# Patient Record
Sex: MALE | Race: WHITE
[De-identification: names, ages, dates, MRNs, and addresses within clinical notes are randomized per-mention and may not be internally consistent; named-entity substitution may affect disease eponyms.]

---

## 2018-01-21 ENCOUNTER — HOSPITAL ENCOUNTER (EMERGENCY)
Dept: HOSPITAL 74 - JERFT | Age: 38
Discharge: HOME | End: 2018-01-21
Payer: COMMERCIAL

## 2018-01-21 VITALS — DIASTOLIC BLOOD PRESSURE: 84 MMHG | TEMPERATURE: 98.6 F | SYSTOLIC BLOOD PRESSURE: 150 MMHG | HEART RATE: 83 BPM

## 2018-01-21 VITALS — BODY MASS INDEX: 33.9 KG/M2
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
## 2018-01-21 DIAGNOSIS — M54.5: Primary | ICD-10-CM
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
## 2018-01-21 DIAGNOSIS — F17.210: ICD-10-CM
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
## 2018-01-21 PROCEDURE — 3E0233Z INTRODUCTION OF ANTI-INFLAMMATORY INTO MUSCLE, PERCUTANEOUS APPROACH: ICD-10-PCS

## 2018-01-21 NOTE — PDOC
History of Present Illness





- General


Chief Complaint: Back Pain


Stated Complaint: BACK PAIN


Time Seen by Provider: 01/21/18 17:13


History Source: Patient


Exam Limitations: No Limitations





- History of Present Illness


Initial Comments: 





01/21/18 17:21


37 yr male history of low back pain states last night was putting on his socks 

and then coughed pulling his low back. Pt felt pain to the low back. no abd 

pain or urine or bowel dysfunction. no numbness or tingling. took aleve at 8am. 





Past History





- Past Medical History


Allergies/Adverse Reactions: 


 Allergies











Allergy/AdvReac Type Severity Reaction Status Date / Time


 


[facility]   Verified 01/21/18 15:35


 


Penicillins Allergy   Verified 01/21/18 15:35











Home Medications: 


Ambulatory Orders





Cyclobenzaprine HCl [Flexeril 10 mg] 10 mg PO TID PRN #21 tablet 01/21/18 


Naproxen [Naprosyn -] 500 mg PO BID PRN #28 tablet 01/21/18 








COPD: No





- Suicide/Smoking/Psychosocial Hx


Smoking History: Current every day smoker


Information on smoking cessation initiated: No


Hx Alcohol Use: No


Drug/Substance Use Hx: No


Substance Use Type: None





Trauma Specific PMHX





- Complaint Specific PMHX


Arthritis: No


Back Injury: Yes


Neck Injury: No





**Review of Systems





- Review of Systems


Able to Perform ROS?: Yes


Is the patient limited English proficient: No


Constitutional: No: Symptoms Reported


HEENTM: No: Symptoms Reported


Respiratory: No: Symptoms reported


Cardiac (ROS): No: Symptoms Reported


ABD/GI: No: Symptoms Reported


: No: Symptoms Reported


Musculoskeletal: Yes: Back Pain


Integumentary: No: Symptoms Reported


Neurological: No: Symptoms reported





*Physical Exam





- Vital Signs


 Last Vital Signs











Temp Pulse Resp BP Pulse Ox


 


 98.6 F   83   18   150/84   100 


 


 01/21/18 15:36  01/21/18 15:36  01/21/18 15:36  01/21/18 15:36  01/21/18 15:36














- Physical Exam


General Appearance: Yes: Nourished, Appropriately Dressed


HEENT: positive: EOMI, FELIX, Normal ENT Inspection, TMs Normal, Pharynx Normal


Neck: positive: Supple.  negative: Tender


Respiratory/Chest: positive: Lungs Clear, Normal Breath Sounds.  negative: 

Chest Tender


Cardiovascular: positive: Regular Rhythm, Regular Rate


Gastrointestinal/Abdominal: positive: Normal Bowel Sounds, Soft


Musculoskeletal: positive: Normal Inspection, Muscle Spasm (right lower 

paraspinal lumbar soft tissue tenderness , neg spinal tenderness ).  negative: 

CVA Tenderness, CVA Tenderness (R), CVA Tenderness (L)


Extremity: positive: Normal Capillary Refill, Normal Inspection, Normal Range 

of Motion.  negative: Tender


Integumentary: positive: Normal Color, Dry, Warm


Neurologic: positive: Fully Oriented, Alert, Normal Mood/Affect, Normal Response

, Motor Strength 5/5





Medical Decision Making





- Medical Decision Making





01/21/18 17:26


cc: low back pain started last night 


no numbness or tingling, 


no urine or bowel dysfunction 


will give toradol 





neg SLR neg saddle anesthesia 








*DC/Admit/Observation/Transfer


Diagnosis at time of Disposition: 


 Back pain at L4-L5 level








- Discharge Dispostion


Disposition: HOME


Condition at time of disposition: Good





- Prescriptions


Prescriptions: 


Cyclobenzaprine HCl [Flexeril 10 mg] 10 mg PO TID PRN #21 tablet


 PRN Reason: Muscle Spasms


Naproxen [Naprosyn -] 500 mg PO BID PRN #28 tablet


 PRN Reason: Back Pain





- Referrals


Referrals: 


Dax Corral MD [Staff Physician] - 





- Patient Instructions


Additional Instructions: 


follow with the orthopedist for follow up 


call tomorrow to make appointment


take naprosyn for pain 


take flexeril for muscle spasm 


no heavy lifting or bending 





- Post Discharge Activity


Forms/Work/School Notes:  Back to Work